# Patient Record
Sex: MALE | Race: WHITE | NOT HISPANIC OR LATINO | Employment: OTHER | RURAL
[De-identification: names, ages, dates, MRNs, and addresses within clinical notes are randomized per-mention and may not be internally consistent; named-entity substitution may affect disease eponyms.]

---

## 2017-06-28 ENCOUNTER — OFFICE VISIT (OUTPATIENT)
Dept: CARDIOLOGY | Facility: CLINIC | Age: 62
End: 2017-06-28

## 2017-06-28 VITALS
HEIGHT: 74 IN | WEIGHT: 293 LBS | BODY MASS INDEX: 37.6 KG/M2 | SYSTOLIC BLOOD PRESSURE: 132 MMHG | HEART RATE: 71 BPM | DIASTOLIC BLOOD PRESSURE: 68 MMHG

## 2017-06-28 DIAGNOSIS — I10 ESSENTIAL HYPERTENSION: ICD-10-CM

## 2017-06-28 DIAGNOSIS — E88.81 METABOLIC SYNDROME: ICD-10-CM

## 2017-06-28 DIAGNOSIS — J44.9 COPD MIXED TYPE (HCC): ICD-10-CM

## 2017-06-28 DIAGNOSIS — I48.0 PAF (PAROXYSMAL ATRIAL FIBRILLATION) (HCC): Primary | ICD-10-CM

## 2017-06-28 DIAGNOSIS — E03.9 ACQUIRED HYPOTHYROIDISM: ICD-10-CM

## 2017-06-28 PROCEDURE — 99213 OFFICE O/P EST LOW 20 MIN: CPT | Performed by: NURSE PRACTITIONER

## 2017-06-28 PROCEDURE — 93000 ELECTROCARDIOGRAM COMPLETE: CPT | Performed by: NURSE PRACTITIONER

## 2017-07-07 RX ORDER — AMLODIPINE BESYLATE 5 MG/1
TABLET ORAL
Qty: 90 TABLET | OUTPATIENT
Start: 2017-07-07

## 2018-01-03 ENCOUNTER — OFFICE VISIT (OUTPATIENT)
Dept: CARDIOLOGY | Facility: CLINIC | Age: 63
End: 2018-01-03

## 2018-01-03 VITALS
SYSTOLIC BLOOD PRESSURE: 130 MMHG | HEIGHT: 74 IN | WEIGHT: 294 LBS | DIASTOLIC BLOOD PRESSURE: 80 MMHG | HEART RATE: 72 BPM | BODY MASS INDEX: 37.73 KG/M2

## 2018-01-03 DIAGNOSIS — I10 ESSENTIAL HYPERTENSION: ICD-10-CM

## 2018-01-03 DIAGNOSIS — E78.00 HYPERCHOLESTEREMIA: ICD-10-CM

## 2018-01-03 DIAGNOSIS — E03.9 ACQUIRED HYPOTHYROIDISM: ICD-10-CM

## 2018-01-03 DIAGNOSIS — E88.81 METABOLIC SYNDROME: ICD-10-CM

## 2018-01-03 DIAGNOSIS — R06.02 SHORTNESS OF BREATH: ICD-10-CM

## 2018-01-03 DIAGNOSIS — I48.0 PAF (PAROXYSMAL ATRIAL FIBRILLATION) (HCC): Primary | ICD-10-CM

## 2018-01-03 PROCEDURE — 99213 OFFICE O/P EST LOW 20 MIN: CPT | Performed by: INTERNAL MEDICINE

## 2018-01-03 PROCEDURE — 93000 ELECTROCARDIOGRAM COMPLETE: CPT | Performed by: INTERNAL MEDICINE

## 2018-01-03 NOTE — PROGRESS NOTES
Chief Complaint   Patient presents with   • Follow-up     For cardiac management. PCP refills medication. Last labs about 3 months ago per PCP. Patient did not bring medication list, patient went over medications verbally. PCP manages Coumadin.   • Shortness of Breath     States about the same, nothing unusual.   • Palpitations     only with over exertion.       CARDIAC COMPLAINTS  dyspnea, lower extremity edema and palpitations        Subjective   Giacomo Gaviria is a 62 y.o. male came in today for his follow up visit.  He has history of chest pain, SOB, PAF, for which he has been managed with Tambocor and Coumadin.  His last cardiac workup done in 2016 showed normal LV function, no ischemia, and his cardiac cath showed small LAD.  He came today with very occasional palpitation and SOB, nothing different than before.  He does have leg edema if he stands for a long time.  He did have lab work done at your office and was told everything was normal.              Cardiac History  Past Surgical History:   Procedure Laterality Date   • CARDIOVASCULAR STRESS TEST  07/06/2006    Stress- (Dr. Vergara) 9 min, 85% THR. BP- 180/110. EF 52%. R/O  Inferior Infarct   • CARDIOVASCULAR STRESS TEST  01/2009    Stress- (Dr. Valadez) 8 min, 85% THR. /90. Negative   • CARDIOVASCULAR STRESS TEST  01/16/2014     L. Myoview- Inferior Infarct. EF 49%   • CARDIOVASCULAR STRESS TEST  04/04/2016    Lexiscan, inferior wall infarct with kina-infarct ischemia   • CATH LAB PROCEDURE  04/19/2016    EF 60%, normal cornaries, small LAD after diagonal   • CONVERTED (HISTORICAL) HOLTER  01/05/2015    Holter- Baseline NSR with mult. runs PAF.   • ECHO - CONVERTED  12/18/2005    Echo- (Dr. Joiner) EF 60%   • ECHO - CONVERTED  06/15/2006    Echo- (Dr. Vergara) EF 65%   • ECHO - CONVERTED  06/11/2008    Echo- (Dr. Vergara) EF 55%   • ECHO - CONVERTED  01/2009    Echo- (Dr. Vergara) EF 60%   • ECHO - CONVERTED  01/16/2014     Echo- EF 55%. Inferior WMA   • ECHO - CONVERTED  04/04/2016    EF 55-60%, mild MR, RVSP 29 mmHg   • KNEE SURGERY         Current Outpatient Prescriptions   Medication Sig Dispense Refill   • amitriptyline (ELAVIL) 10 MG tablet 3 tablets at bedtime     • benazepril (LOTENSIN) 40 MG tablet 1/2 tablet twice daily     • diltiazem (TAZTIA XT) 360 MG 24 hr capsule Take 360 mg by mouth daily.     • doxazosin (CARDURA) 4 MG tablet Take 4 mg by mouth 2 (two) times a day.     • flecainide (TAMBOCOR) 100 MG tablet 1/2 tablet twice daily (Patient taking differently: Take 150 mg by mouth 2 (Two) Times a Day.) 90 tablet 2   • furosemide (LASIX) 80 MG tablet Take 80 mg by mouth daily.     • HYDROcodone-ibuprofen (VICOPROFEN) 7.5-200 MG per tablet Take 1 tablet by mouth 2 (Two) Times a Day.     • ipratropium-albuterol (COMBIVENT RESPIMAT)  MCG/ACT inhaler Inhale 1 puff 4 (four) times a day as needed for wheezing.     • levothyroxine (SYNTHROID) 75 MCG tablet Take 75 mcg by mouth daily.     • nitroglycerin (NITROSTAT) 0.4 MG SL tablet Place 0.4 mg under the tongue every 5 (five) minutes as needed for chest pain. Take no more than 3 doses in 15 minutes.     • potassium chloride (KLOR-CON) 20 MEQ packet Take 20 mEq by mouth 2 (two) times a day.     • tamsulosin (FLOMAX) 0.4 MG capsule 24 hr capsule Take 1 capsule by mouth daily.     • warfarin (COUMADIN) 5 MG tablet Take 5 mg by mouth daily. (PCP monitors tablet)     • zolpidem (AMBIEN) 10 MG tablet Take 10 mg by mouth At Night As Needed for sleep.       No current facility-administered medications for this visit.        Allergies  :  Demerol [meperidine]       Past Medical History:   Diagnosis Date   • Arthritis     Dr. Chavez   • Atrial fibrillation    • Back pain    • H/O knee surgery     bilateral   • Hypercholesteremia    • Hypertension    • Hypothyroidism    • Knee pain    • Sleep apnea        Social History     Social History   • Marital status:      Spouse name:  "N/A   • Number of children: N/A   • Years of education: N/A     Occupational History   • Not on file.     Social History Main Topics   • Smoking status: Former Smoker     Quit date: 1993   • Smokeless tobacco: Current User     Types: Snuff      Comment: Quit in 1993   • Alcohol use No   • Drug use: No   • Sexual activity: Not on file     Other Topics Concern   • Not on file     Social History Narrative       Family History   Problem Relation Age of Onset   • No Known Problems Mother    • Heart attack Father    • Heart attack Brother        Review of Systems   Constitution: Positive for malaise/fatigue. Negative for decreased appetite.   HENT: Negative for congestion and sore throat.    Eyes: Negative for blurred vision.   Cardiovascular: Positive for dyspnea on exertion, leg swelling and palpitations. Negative for chest pain.   Respiratory: Positive for shortness of breath. Negative for snoring.    Endocrine: Negative for cold intolerance and heat intolerance.   Hematologic/Lymphatic: Negative for adenopathy. Does not bruise/bleed easily.   Skin: Negative for itching, nail changes and skin cancer.   Musculoskeletal: Positive for arthritis and myalgias.   Gastrointestinal: Negative for abdominal pain, dysphagia and heartburn.   Genitourinary: Negative for bladder incontinence and frequency.   Neurological: Positive for dizziness. Negative for light-headedness, seizures and vertigo.   Psychiatric/Behavioral: Negative for altered mental status.   Allergic/Immunologic: Negative for environmental allergies and hives.       Diabetes- No  Thyroid- abnormal    Objective     /80  Pulse 72  Ht 188 cm (74.02\")  Wt 133 kg (294 lb)  BMI 37.73 kg/m2    Physical Exam   Constitutional: He is oriented to person, place, and time. He appears well-developed and well-nourished.   HENT:   Head: Normocephalic.   Eyes: EOM are normal. Pupils are equal, round, and reactive to light.   Neck: Normal range of motion. Neck supple. "   Cardiovascular: Normal rate, regular rhythm, S1 normal and S2 normal.    Murmur heard.  Pulmonary/Chest: Effort normal and breath sounds normal.   Abdominal: Soft. Bowel sounds are normal.   Musculoskeletal: Normal range of motion. He exhibits no edema.   Neurological: He is alert and oriented to person, place, and time.   Skin: Skin is warm and dry.   Psychiatric: He has a normal mood and affect.       ECG 12 Lead  Date/Time: 1/3/2018 11:27 AM  Performed by: SYED CORONA  Authorized by: SYED CORONA   Comparison: compared with previous ECG from 6/28/2017  Similar to previous ECG  Rhythm: sinus rhythm  Rate: normal  Conduction: 1st degree and non-specific intraventricular conduction delay  QRS axis: normal  Clinical impression: abnormal ECG                    Assessment/Plan     Giacomo was seen today for follow-up, shortness of breath and palpitations.    Diagnoses and all orders for this visit:    PAF (paroxysmal atrial fibrillation)    Metabolic syndrome    Acquired hypothyroidism    Essential hypertension    Hypercholesteremia    Shortness of breath       His HR and BP is stable.  His BMI is almost 38.  His EKG shows sinus rhythm with 1st degree AV block, almost same as before.  At this time, continue same medications.  I had a long talk with him about diet, exercise, and weight reduction.  Overall cardiac status appears to be stable.  No new investigation was ordered at this time.                  Electronically signed by Syed Corona MD January 3, 2018 11:24 AM

## 2018-08-01 NOTE — PROGRESS NOTES
Chief Complaint   Patient presents with   • Follow-up     For cardiac management. Patient is not on aspirin. Reports that shortness of breath is about the same. Reports that he did have trouble around the first of July with afib, but says it went away.   • Med Refill     PCP does medications.        Cardiac Complaints  dyspnea      Subjective   Giacomo Gaviria is a 63 y.o. male with a history of paroxysmal atrial fibrillation, hypertension, hypothyroidism, and metabolic syndrome. He also has issues with back pain and multiple myalgias for which he has followed with  from . Due to chest pain and shortness of breath a stress test and echocardiogram were ordered in 2016. Possible ischemia was noted. A cardiac catheterization was done and found to have normal coronaries with small LAD past the diagonal. His LV function was normal. He continues to be managed medically.  He returns today for follow up and reports continued shortness of breath unchanged from before.  He does state problems with palpitations and irregular pulse in July but states these problems have resolved.  He continues on coumadin therapy for anticoagulation monitored by PCP.  No refills needed.  He does state INR has been hard to manage as of late as he had biopsy on prostate where they held coumadin and hard to get back into levels since. He does report being diagnosed with prostate CA that is a 6 on the ramirez scale, aggressive observation approach has been implemented by Dr. Armstrong in [place].  He will follow up with serial PSAs.        Cardiac History  Past Surgical History:   Procedure Laterality Date   • CARDIAC CATHETERIZATION  04/19/2016    EF 60%, normal cornaries, small LAD after diagonal   • CARDIOVASCULAR STRESS TEST  07/06/2006    Stress- (Dr. Vergara) 9 min, 85% THR. BP- 180/110. EF 52%. R/O  Inferior Infarct   • CARDIOVASCULAR STRESS TEST  01/2009    Stress- (Dr. Valadez) 8 min, 85% THR. /90. Negative   • CARDIOVASCULAR  STRESS TEST  01/16/2014     L. Myoview- Inferior Infarct. EF 49%   • CARDIOVASCULAR STRESS TEST  04/04/2016    Lexiscan, inferior wall infarct with kina-infarct ischemia   • CONVERTED (HISTORICAL) HOLTER  01/05/2015    Holter- Baseline NSR with mult. runs PAF.   • ECHO - CONVERTED  12/18/2005    Echo- (Dr. Joinre) EF 60%   • ECHO - CONVERTED  06/15/2006    Echo- (Dr. Vergara) EF 65%   • ECHO - CONVERTED  06/11/2008    Echo- (Dr. Vergara) EF 55%   • ECHO - CONVERTED  01/2009    Echo- (Dr. Vergara) EF 60%   • ECHO - CONVERTED  01/16/2014    Echo- EF 55%. Inferior WMA   • ECHO - CONVERTED  04/04/2016    EF 55-60%, mild MR, RVSP 29 mmHg       Current Outpatient Prescriptions   Medication Sig Dispense Refill   • amitriptyline (ELAVIL) 10 MG tablet 3 tablets at bedtime     • amLODIPine (NORVASC) 5 MG tablet Take 5 mg by mouth Daily.     • benazepril (LOTENSIN) 40 MG tablet 1/2 tablet twice daily     • diltiazem (TAZTIA XT) 360 MG 24 hr capsule Take 360 mg by mouth daily.     • doxazosin (CARDURA) 4 MG tablet Take 4 mg by mouth 2 (two) times a day.     • flecainide (TAMBOCOR) 100 MG tablet Take 150 mg by mouth 2 (Two) Times a Day.     • furosemide (LASIX) 80 MG tablet Take 80 mg by mouth daily.     • HYDROcodone-ibuprofen (VICOPROFEN) 7.5-200 MG per tablet Take 1 tablet by mouth 2 (Two) Times a Day.     • levothyroxine (SYNTHROID) 75 MCG tablet Take 75 mcg by mouth daily.     • mometasone-formoterol (DULERA 100) 100-5 MCG/ACT inhaler Inhale 2 puffs 2 (Two) Times a Day.     • montelukast (SINGULAIR) 10 MG tablet Take 10 mg by mouth Every Night.     • nitroglycerin (NITROSTAT) 0.4 MG SL tablet Place 0.4 mg under the tongue every 5 (five) minutes as needed for chest pain. Take no more than 3 doses in 15 minutes.     • potassium chloride (KLOR-CON) 20 MEQ packet Take 20 mEq by mouth 2 (two) times a day.     • tamsulosin (FLOMAX) 0.4 MG capsule 24 hr capsule Take 1 capsule by mouth daily.     • warfarin  (COUMADIN) 5 MG tablet Take 5 mg by mouth daily. (PCP monitors tablet)     • zolpidem (AMBIEN) 10 MG tablet Take 10 mg by mouth At Night As Needed for sleep.       No current facility-administered medications for this visit.        Demerol [meperidine]    Past Medical History:   Diagnosis Date   • Arthritis     Dr. Chavez   • Atrial fibrillation (CMS/HCC)    • Back pain    • H/O knee surgery     bilateral   • Hypercholesteremia    • Hypertension    • Hypothyroidism    • Knee pain    • Sleep apnea        Social History     Social History   • Marital status:      Spouse name: N/A   • Number of children: N/A   • Years of education: N/A     Occupational History   • Not on file.     Social History Main Topics   • Smoking status: Former Smoker     Quit date: 1993   • Smokeless tobacco: Former User     Types: Snuff     Quit date: 5/8/2018      Comment: Quit in 1993   • Alcohol use No   • Drug use: No   • Sexual activity: Not on file     Other Topics Concern   • Not on file     Social History Narrative   • No narrative on file       Family History   Problem Relation Age of Onset   • No Known Problems Mother    • Heart attack Father    • Heart attack Brother        Review of Systems   Constitution: Negative for weakness and malaise/fatigue.   Cardiovascular: Positive for dyspnea on exertion. Negative for chest pain, irregular heartbeat, near-syncope, orthopnea, palpitations and syncope.   Respiratory: Positive for shortness of breath. Negative for cough and wheezing.    Musculoskeletal: Negative for back pain, joint pain and joint swelling.   Gastrointestinal: Negative for anorexia, heartburn, nausea and vomiting.   Genitourinary: Negative for dysuria, hematuria and nocturia.   Neurological: Negative for dizziness, light-headedness and loss of balance.   Psychiatric/Behavioral: Negative for depression and memory loss. The patient is not nervous/anxious.            Objective     /80 (BP Location: Left arm)    "Pulse 67   Ht 188 cm (74.02\")   Wt 132 kg (291 lb)   BMI 37.35 kg/m²     Physical Exam   Constitutional: He is oriented to person, place, and time. He appears well-developed and well-nourished.   HENT:   Head: Normocephalic and atraumatic.   Eyes: Pupils are equal, round, and reactive to light. EOM are normal.   Neck: Normal range of motion. Neck supple.   Cardiovascular: Normal rate and regular rhythm.    Murmur heard.  Pulmonary/Chest: Effort normal and breath sounds normal.   Abdominal: Soft.   Musculoskeletal: Normal range of motion.   Neurological: He is alert and oriented to person, place, and time.   Skin: Skin is warm and dry.   Psychiatric: He has a normal mood and affect. His behavior is normal.         ECG 12 Lead  Date/Time: 8/8/2018 11:31 AM  Performed by: EUGENIO HARRIS  Authorized by: EUGENIO HARRIS   Rhythm: sinus rhythm  BPM: 67  Conduction: non-specific intraventricular conduction delay            Assessment/Plan     HR and BP are stable today. HTN is well managed on current regimen, no adjustment advised.  EKG done today for PAF with coumadin therapy for anticoagulation and tambocor for antiarrythmic therapy shows a NSR with normal QT, similar to prior.  Current advised.  PT/INR he reports with your office.  For hyperlipidemia management, he continues with dietary management.  For COPD, he is following with you and using dulera therapy daily.  He was encouraged to discuss possible pulmonary referral with your office as I feel he would benefit.  BMI elevated at 37.37, good cardiac diet with limited caloric intake, saturated fat, and limited carbs advised as well as activity as tolerated.  6 month follow up scheduled or sooner if needed.        Problems Addressed this Visit        Cardiovascular and Mediastinum    PAF (paroxysmal atrial fibrillation) (CMS/HCC) - Primary    Relevant Medications    amLODIPine (NORVASC) 5 MG tablet    Other Relevant Orders    ECG 12 Lead    Essential " hypertension    Relevant Medications    amLODIPine (NORVASC) 5 MG tablet    Hypercholesteremia       Respiratory    COPD mixed type (CMS/HCC)    Relevant Medications    montelukast (SINGULAIR) 10 MG tablet    mometasone-formoterol (DULERA 100) 100-5 MCG/ACT inhaler    Shortness of breath       Endocrine    Acquired hypothyroidism      Other Visit Diagnoses     Prostate cancer (CMS/[facility])        Obesity (BMI 30-39.9)              Patient's Body mass index is 37.35 kg/m². BMI is above normal parameters. Recommendations include: nutrition counseling.          Electronically signed by HANG Grey August 8, 2018 4:55 PM

## 2018-08-08 ENCOUNTER — OFFICE VISIT (OUTPATIENT)
Dept: CARDIOLOGY | Facility: CLINIC | Age: 63
End: 2018-08-08

## 2018-08-08 VITALS
DIASTOLIC BLOOD PRESSURE: 80 MMHG | HEART RATE: 67 BPM | BODY MASS INDEX: 37.35 KG/M2 | HEIGHT: 74 IN | WEIGHT: 291 LBS | SYSTOLIC BLOOD PRESSURE: 122 MMHG

## 2018-08-08 DIAGNOSIS — J44.9 COPD MIXED TYPE (HCC): ICD-10-CM

## 2018-08-08 DIAGNOSIS — E78.00 HYPERCHOLESTEREMIA: ICD-10-CM

## 2018-08-08 DIAGNOSIS — E66.9 OBESITY (BMI 30-39.9): ICD-10-CM

## 2018-08-08 DIAGNOSIS — C61 PROSTATE CANCER (HCC): ICD-10-CM

## 2018-08-08 DIAGNOSIS — I48.0 PAF (PAROXYSMAL ATRIAL FIBRILLATION) (HCC): Primary | ICD-10-CM

## 2018-08-08 DIAGNOSIS — E03.9 ACQUIRED HYPOTHYROIDISM: ICD-10-CM

## 2018-08-08 DIAGNOSIS — R06.02 SHORTNESS OF BREATH: ICD-10-CM

## 2018-08-08 DIAGNOSIS — I10 ESSENTIAL HYPERTENSION: ICD-10-CM

## 2018-08-08 PROCEDURE — 93000 ELECTROCARDIOGRAM COMPLETE: CPT | Performed by: NURSE PRACTITIONER

## 2018-08-08 PROCEDURE — 99213 OFFICE O/P EST LOW 20 MIN: CPT | Performed by: NURSE PRACTITIONER

## 2018-08-08 RX ORDER — MONTELUKAST SODIUM 10 MG/1
10 TABLET ORAL NIGHTLY
COMMUNITY
End: 2019-07-31 | Stop reason: ALTCHOICE

## 2018-08-08 RX ORDER — FLECAINIDE ACETATE 100 MG/1
150 TABLET ORAL 2 TIMES DAILY
COMMUNITY

## 2018-08-08 RX ORDER — AMLODIPINE BESYLATE 5 MG/1
5 TABLET ORAL DAILY
COMMUNITY

## 2019-01-30 ENCOUNTER — OFFICE VISIT (OUTPATIENT)
Dept: CARDIOLOGY | Facility: CLINIC | Age: 64
End: 2019-01-30

## 2019-01-30 VITALS
BODY MASS INDEX: 38.76 KG/M2 | SYSTOLIC BLOOD PRESSURE: 140 MMHG | WEIGHT: 302 LBS | HEIGHT: 74 IN | DIASTOLIC BLOOD PRESSURE: 80 MMHG | HEART RATE: 72 BPM

## 2019-01-30 DIAGNOSIS — Z79.01 CURRENT USE OF LONG TERM ANTICOAGULATION: ICD-10-CM

## 2019-01-30 DIAGNOSIS — I10 ESSENTIAL HYPERTENSION: ICD-10-CM

## 2019-01-30 DIAGNOSIS — R06.02 SHORTNESS OF BREATH: ICD-10-CM

## 2019-01-30 DIAGNOSIS — E78.00 HYPERCHOLESTEREMIA: ICD-10-CM

## 2019-01-30 DIAGNOSIS — I48.0 PAF (PAROXYSMAL ATRIAL FIBRILLATION) (HCC): Primary | ICD-10-CM

## 2019-01-30 DIAGNOSIS — Z79.899 LONG TERM CURRENT USE OF ANTIARRHYTHMIC DRUG: ICD-10-CM

## 2019-01-30 DIAGNOSIS — E88.81 METABOLIC SYNDROME: ICD-10-CM

## 2019-01-30 PROCEDURE — 93000 ELECTROCARDIOGRAM COMPLETE: CPT | Performed by: NURSE PRACTITIONER

## 2019-01-30 PROCEDURE — 99214 OFFICE O/P EST MOD 30 MIN: CPT | Performed by: NURSE PRACTITIONER

## 2019-01-30 RX ORDER — WARFARIN SODIUM 1 MG/1
TABLET ORAL
COMMUNITY

## 2019-01-30 RX ORDER — FLUTICASONE PROPIONATE 50 MCG
2 SPRAY, SUSPENSION (ML) NASAL DAILY
COMMUNITY

## 2019-01-30 RX ORDER — PAROXETINE 10 MG/1
TABLET, FILM COATED ORAL
COMMUNITY
End: 2019-07-31 | Stop reason: ALTCHOICE

## 2019-01-30 NOTE — PROGRESS NOTES
"Chief Complaint   Patient presents with   • Follow-up     Cardiac management. He states \"couple months ago my heart got out of rhythm, went to ER was giving Lanoxin in ER. Heart went back in rhythm\". He feels was related to anxiety, was started on Paxil. He was dx with prostate cancer 7/2018. PCP writes refills on medication.   • Shortness of Breath     He only notices if in hurry or while eating, he relates to COPD.       Virgil Gaviria is a 63 y.o. male with a history of PAF s/p ablation in the past, hypertension, hypothyroidism, and metabolic syndrome. He also has back pain and multiple myalgias followed at . Due to chest pain and shortness of breath, stress test in 2016 showed possible ischemia. Cath found normal coronaries with small LAD beyond the diagonal. He is anticoagulated with Coumadin monitored by PCP. Flecainide and diltiazem have kept him in sinus. He has been diagnosed with prostate cancer that is a 6 on ramirez scale with aggressive observation approach implemented by Dr. Armstrong in [place].  He will follow up with serial PSAs. He will be seeing another specialist in [place] in March. He came today for follow up. He denies cardiac symptoms, no chest pain, shortness of breath, dizziness or syncope. He had one episode a couple of months ago with palpitations, \"my heart got out of rhythm\".  Treated in the ER with Lanoxin and went back in rhythm. Labs including INR followed with PCP. Stable warfarin dosing.     HPI         Cardiac History:    Past Surgical History:   Procedure Laterality Date   • CARDIAC ABLATION  2008 2004 and 2008 PVA for AF per Dr. Cosmo Hall group   • CARDIAC CATHETERIZATION  04/19/2016    EF 60%, normal cornaries, small LAD after diagonal   • CARDIOVASCULAR STRESS TEST  07/06/2006    Stress- (Dr. Vergara) 9 min, 85% THR. BP- 180/110. EF 52%. R/O  Inferior Infarct   • CARDIOVASCULAR STRESS TEST  01/2009    Stress- (Dr. Valadez) 8 min, 85% THR. BP " 162/90. Negative   • CARDIOVASCULAR STRESS TEST  01/16/2014     L. Myoview- Inferior Infarct. EF 49%   • CARDIOVASCULAR STRESS TEST  04/04/2016    Lexiscan, inferior wall infarct with kina-infarct ischemia   • CONVERTED (HISTORICAL) HOLTER  01/05/2015    Holter- Baseline NSR with mult. runs PAF.   • ECHO - CONVERTED  12/18/2005    Echo- (Dr. Joiner) EF 60%   • ECHO - CONVERTED  06/15/2006    Echo- (Dr. Vergara) EF 65%   • ECHO - CONVERTED  06/11/2008    Echo- (Dr. Vergara) EF 55%   • ECHO - CONVERTED  01/2009    Echo- (Dr. Vergara) EF 60%   • ECHO - CONVERTED  01/16/2014    Echo- EF 55%. Inferior WMA   • ECHO - CONVERTED  04/04/2016    EF 55-60%, mild MR, RVSP 29 mmHg       Current Outpatient Medications   Medication Sig Dispense Refill   • amitriptyline (ELAVIL) 10 MG tablet 3 tablets at bedtime     • amLODIPine (NORVASC) 5 MG tablet Take 5 mg by mouth Daily.     • benazepril (LOTENSIN) 40 MG tablet 1/2 tablet twice daily     • diltiazem (TAZTIA XT) 360 MG 24 hr capsule Take 360 mg by mouth daily.     • doxazosin (CARDURA) 4 MG tablet Take 4 mg by mouth 2 (two) times a day.     • flecainide (TAMBOCOR) 100 MG tablet Take 150 mg by mouth 2 (Two) Times a Day.     • fluticasone (FLONASE) 50 MCG/ACT nasal spray 2 sprays into the nostril(s) as directed by provider Daily.     • furosemide (LASIX) 80 MG tablet Take 80 mg by mouth daily.     • HYDROcodone-ibuprofen (VICOPROFEN) 7.5-200 MG per tablet Take 1 tablet by mouth 2 (Two) Times a Day As Needed.     • levothyroxine (SYNTHROID) 75 MCG tablet Take 75 mcg by mouth daily.     • mometasone-formoterol (DULERA 100) 100-5 MCG/ACT inhaler Inhale 2 puffs 2 (Two) Times a Day.     • montelukast (SINGULAIR) 10 MG tablet Take 10 mg by mouth Every Night.     • nitroglycerin (NITROSTAT) 0.4 MG SL tablet Place 0.4 mg under the tongue every 5 (five) minutes as needed for chest pain. Take no more than 3 doses in 15 minutes.     • PARoxetine (PAXIL) 10 MG tablet 1  tablet in am and 1/2 tablet at night     • potassium chloride (KLOR-CON) 20 MEQ packet Take 20 mEq by mouth 2 (two) times a day.     • tamsulosin (FLOMAX) 0.4 MG capsule 24 hr capsule Take 1 capsule by mouth daily.     • warfarin (COUMADIN) 1 MG tablet 1 tablet on  and .     • warfarin (COUMADIN) 5 MG tablet Take 5 mg by mouth daily. (PCP monitors tablet)     • zolpidem (AMBIEN) 10 MG tablet Take 10 mg by mouth At Night As Needed for sleep.       No current facility-administered medications for this visit.        Tylenol [acetaminophen] and Demerol [meperidine]    Past Medical History:   Diagnosis Date   • Arthritis     Dr. Chavez   • Atrial fibrillation (CMS/HCC)    • Back pain    • Cancer (CMS/HCC) 2018    prostate cancer   • H/O knee surgery     bilateral   • Hypercholesteremia    • Hypertension    • Hypothyroidism    • Knee pain    • Sleep apnea        Social History     Socioeconomic History   • Marital status:      Spouse name: Not on file   • Number of children: Not on file   • Years of education: Not on file   • Highest education level: Not on file   Social Needs   • Financial resource strain: Not on file   • Food insecurity - worry: Not on file   • Food insecurity - inability: Not on file   • Transportation needs - medical: Not on file   • Transportation needs - non-medical: Not on file   Occupational History   • Not on file   Tobacco Use   • Smoking status: Former Smoker     Last attempt to quit:      Years since quittin.0   • Smokeless tobacco: Former User     Types: Snuff     Quit date: 2018   • Tobacco comment: Quit in    Substance and Sexual Activity   • Alcohol use: No   • Drug use: No   • Sexual activity: Not on file   Other Topics Concern   • Not on file   Social History Narrative   • Not on file       Family History   Problem Relation Age of Onset   • No Known Problems Mother    • Heart attack Father    • Heart attack Brother        Review of Systems  "  Constitution: Positive for weight gain (increased by 11 ). Negative for decreased appetite, weakness and malaise/fatigue.   HENT: Negative.    Eyes: Negative.    Cardiovascular: Negative for chest pain, dyspnea on exertion, leg swelling, palpitations and syncope.   Respiratory: Negative for cough.    Endocrine: Negative.    Hematologic/Lymphatic: Negative.    Skin: Negative.    Musculoskeletal: Positive for arthritis, back pain, joint pain, myalgias and stiffness.   Gastrointestinal: Negative for abdominal pain and melena.   Genitourinary: Negative for dysuria and hematuria.   Psychiatric/Behavioral: Negative for altered mental status and depression.   Allergic/Immunologic: Negative.       Diabetes- No  Thyroid-normal    Objective     /80 (BP Location: Left arm)   Pulse 72   Ht 188 cm (74.02\")   Wt (!) 137 kg (302 lb)   BMI 38.76 kg/m²     Physical Exam   Constitutional: He is oriented to person, place, and time. He appears well-developed and well-nourished. No distress.   HENT:   Head: Normocephalic.   Eyes: Pupils are equal, round, and reactive to light.   Neck: Normal range of motion.   Cardiovascular: Normal rate, regular rhythm and intact distal pulses.   Pulmonary/Chest: Effort normal and breath sounds normal. No respiratory distress. He has no wheezes.   Abdominal: Soft. Bowel sounds are normal.   Musculoskeletal: Normal range of motion. He exhibits no edema.   Neurological: He is alert and oriented to person, place, and time.   Skin: Skin is warm and dry. He is not diaphoretic.   Psychiatric: He has a normal mood and affect.   Nursing note and vitals reviewed.       ECG 12 Lead  Date/Time: 1/30/2019 4:58 PM  Performed by: Ondina Parmar APRN  Authorized by: Ondina Parmar APRN   Comparison: compared with previous ECG from 8/8/2018  Similar to previous ECG  Rhythm: sinus rhythm  Rate: normal  BPM: 72  Conduction: 1st degree  Clinical impression: abnormal ECG  Comments:  ms  QTc 428 " "ms                Assessment/Plan    Heart rate and blood pressure are stable. EKG for PAF on flecainide showed sinus rhythm at 72 bpm, first degree AV block and normal QT interval similar to previous EKG. Coumadin for anticoagulation is followed by Eb with stable INR reported. We discussed NOAC and he is going to think about it. He is now off Lipitor secondary to myalgia. He claims the lipids are back up and did not change his symptoms, therefore he is considering restarting. His weight has increased by 11 pounds. We discussed diet in detail. He drinks at least four glasses of 2% mild daily, and often he does this because \"it cools off his insides\". He may need to be treated for acid reflux but he prefers a natural herb? Advised to reduce milk intake and overall calorie intake for weight management. Limit sodium, carbohydrates, and saturated fats. Increase daily activity. He has no chest pain and coronaries were normal in 2016, therefore no testing ordered today. He appears to be stable. We will see him back in six months or sooner if needed.    Giacomo was seen today for follow-up and shortness of breath.    Diagnoses and all orders for this visit:    PAF (paroxysmal atrial fibrillation) (CMS/HCC)    Essential hypertension    Hypercholesteremia    Shortness of breath    Metabolic syndrome    Current use of long term anticoagulation    Long term current use of antiarrhythmic drug    Other orders  -     ECG 12 Lead        Patient's Body mass index is 38.76 kg/m². BMI is above normal parameters. Recommendations include: nutrition counseling.               Electronically signed by HANG Syed,  January 30, 2019 5:05 PM  "

## 2019-07-25 PROCEDURE — 93306 TTE W/DOPPLER COMPLETE: CPT | Performed by: INTERNAL MEDICINE

## 2019-07-26 NOTE — PROGRESS NOTES
Chief Complaint   Patient presents with   • Follow-up     For cardiac management. Patient is not on aspirin. Did have some chest pain, did end up in the ER, was in afib, but was told he had not had a heart attack, was earlier this month. Reports that he could barely walk to the shower without getting out of breath, was put on hydroxyzine for anxiety. Yesterday felt like he has some pressure in his head, states BP was elevated. Last lab work was done at ER, not in chart, did have some done before than with PCP, not in chart. Had an echo at [facility] last week.    • Med Refill     PCP does medication refills. Brought medications with visit.        Cardiac Complaints  dyspnea and palpitations      Subjective   Giacomo Gaviria is a 64 y.o. male with PAF s/p ablation in the past, hypertension, hyperlipidemia, hypothyroidism, and metabolic syndrome.  Patient also has a history of significant myalgias followed by . Cath in 2016  found normal coronaries with small LAD beyond the diagonal. He continues on coumadin therapy for anticoagulation in regards to afib. He has been diagnosed with prostate cancer that is a 6 on ramirez scale with aggressive observation approach implemented by Dr. Armstrong in [place].  At last visit, patient reported a breakthrough episode of afib for which he went to ER and went back to rhythm with digoxin therapy.    He returns today for follow up and reports issues with afib.  He was in the hospital in June and July with breakthrough afib. Patient reported it happened in the evening both times and his heart was beating quickly.  He was given medication while at the hospital and converted.  Patient sometimes will use extra tambocor or digoxin prn when in afib. He was afraid he was having a heart attack but workup was negative. He does admit he has been having shortness of breath with exertion but feels like it is reflux related as he states almost anything he eats comes up in his throat and then he will  notice chest tightness when it happens.  Patient reports problem has been going on for about 3 months and that L.Lysine in the past always worked well to get rid of symptoms but he is doing that now without relief.  He denies following up with Dr. Armstrong for prostate biopsy as he cancelled appointment. Labs he admits remain followed by PCP and were done recently at [facility].  No refills needed.           Cardiac History  Past Surgical History:   Procedure Laterality Date   • CARDIAC ABLATION  2008 2004 and 2008 PVA for AF per Dr. Cosmo Hall group   • CARDIAC CATHETERIZATION  04/19/2016    EF 60%, normal cornaries, small LAD after diagonal   • CARDIOVASCULAR STRESS TEST  07/06/2006    Stress- (Dr. Vergara) 9 min, 85% THR. BP- 180/110. EF 52%. R/O  Inferior Infarct   • CARDIOVASCULAR STRESS TEST  01/2009    Stress- (Dr. Valadez) 8 min, 85% THR. /90. Negative   • CARDIOVASCULAR STRESS TEST  01/16/2014     L. Myoview- Inferior Infarct. EF 49%   • CARDIOVASCULAR STRESS TEST  04/04/2016    Lexiscan, inferior wall infarct with kina-infarct ischemia   • CONVERTED (HISTORICAL) HOLTER  01/05/2015    Holter- Baseline NSR with mult. runs PAF.   • ECHO - CONVERTED  12/18/2005    Echo- (Dr. Joiner) EF 60%   • ECHO - CONVERTED  06/15/2006    Echo- (Dr. Vergara) EF 65%   • ECHO - CONVERTED  06/11/2008    Echo- (Dr. Vergara) EF 55%   • ECHO - CONVERTED  01/2009    Echo- (Dr. Vergara) EF 60%   • ECHO - CONVERTED  01/16/2014    Echo- EF 55%. Inferior WMA   • ECHO - CONVERTED  04/04/2016    EF 55-60%, mild MR, RVSP 29 mmHg   • ECHO - CONVERTED  07/31/2019    @ [facility]. EF 55%. LA- 4.34. AO- 3.9. Mild MR. RVSP- 35 mmHg.       Current Outpatient Medications   Medication Sig Dispense Refill   • amitriptyline (ELAVIL) 10 MG tablet 3 tablets at bedtime     • amLODIPine (NORVASC) 5 MG tablet Take 5 mg by mouth Daily.     • benazepril (LOTENSIN) 40 MG tablet Take 40 mg by mouth Daily.     • digoxin (LANOXIN) 125 MCG  tablet Take 62.5 mcg by mouth Every Other Day As Needed.     • doxazosin (CARDURA) 4 MG tablet Take 4 mg by mouth 2 (two) times a day.     • flecainide (TAMBOCOR) 100 MG tablet Take 150 mg by mouth 2 (Two) Times a Day.     • fluticasone (FLONASE) 50 MCG/ACT nasal spray 2 sprays into the nostril(s) as directed by provider Daily.     • furosemide (LASIX) 80 MG tablet Take 80 mg by mouth daily.     • HYDROcodone-ibuprofen (VICOPROFEN) 7.5-200 MG per tablet Take 1 tablet by mouth 2 (Two) Times a Day As Needed.     • hydrOXYzine (ATARAX) 25 MG tablet Take 25 mg by mouth Every 6 (Six) Hours As Needed for Itching or Anxiety.     • Indacaterol-Glycopyrrolate (UTIBRON NEOHALER) 27.5-15.6 MCG capsule Place  into inhaler and inhale.     • levothyroxine (SYNTHROID) 75 MCG tablet Take 75 mcg by mouth daily.     • loratadine (CLARITIN) 10 MG tablet Take 10 mg by mouth Daily.     • nitroglycerin (NITROSTAT) 0.4 MG SL tablet Place 0.4 mg under the tongue every 5 (five) minutes as needed for chest pain. Take no more than 3 doses in 15 minutes.     • PARoxetine (PAXIL) 20 MG tablet Take 20 mg by mouth Every Morning.     • potassium chloride (KLOR-CON) 20 MEQ packet Take 20 mEq by mouth 2 (two) times a day.     • tamsulosin (FLOMAX) 0.4 MG capsule 24 hr capsule Take 1 capsule by mouth daily.     • warfarin (COUMADIN) 1 MG tablet 1 tablet on Tuesdays and Thursdays.     • warfarin (COUMADIN) 5 MG tablet Take 5 mg by mouth daily. (PCP monitors tablet)     • zolpidem (AMBIEN) 10 MG tablet Take 10 mg by mouth At Night As Needed for sleep.     • diltiaZEM CD (CARDIZEM CD) 240 MG 24 hr capsule One tablet PM 90 capsule 2   • diltiazem XR (DILACOR XR) 120 MG 24 hr capsule Take 1 tablet in AM 90 capsule 3     No current facility-administered medications for this visit.        Tylenol [acetaminophen] and Demerol [meperidine]    Past Medical History:   Diagnosis Date   • Arthritis     Dr. Chavez   • Atrial fibrillation (CMS/HCC)    • Back pain   "  • Cancer (CMS/[facility]) 2018    prostate cancer   • H/O knee surgery     bilateral   • Hypercholesteremia    • Hypertension    • Hypothyroidism    • Knee pain    • Sleep apnea        Social History     Socioeconomic History   • Marital status:      Spouse name: Not on file   • Number of children: Not on file   • Years of education: Not on file   • Highest education level: Not on file   Tobacco Use   • Smoking status: Former Smoker     Last attempt to quit:      Years since quittin.5   • Smokeless tobacco: Former User     Types: Snuff     Quit date: 2018   • Tobacco comment: Quit in    Substance and Sexual Activity   • Alcohol use: No   • Drug use: No       Family History   Problem Relation Age of Onset   • No Known Problems Mother    • Heart attack Father    • Heart attack Brother        Review of Systems   Constitution: Positive for malaise/fatigue. Negative for weakness.   Cardiovascular: Positive for dyspnea on exertion, irregular heartbeat and palpitations. Negative for chest pain, claudication, leg swelling, near-syncope, orthopnea and syncope.   Respiratory: Positive for shortness of breath and wheezing. Negative for cough.    Musculoskeletal: Positive for joint pain and stiffness. Negative for back pain.   Gastrointestinal: Positive for dysphagia and heartburn. Negative for anorexia, nausea and vomiting.   Genitourinary: Negative for dysuria, hematuria, hesitancy and nocturia.   Neurological: Positive for light-headedness. Negative for dizziness, headaches and loss of balance.   Psychiatric/Behavioral: Negative for depression and memory loss. The patient is nervous/anxious.            Objective     /90 (BP Location: Right arm)   Pulse 71   Ht 188 cm (74.02\")   Wt (!) 138 kg (304 lb)   BMI 39.01 kg/m²     Physical Exam   Constitutional: He is oriented to person, place, and time. He appears well-developed and well-nourished.   HENT:   Head: Normocephalic and atraumatic.   Eyes: " EOM are normal. Pupils are equal, round, and reactive to light.   Neck: Normal range of motion. Neck supple.   Cardiovascular: Normal rate and regular rhythm.   Murmur heard.  Pulmonary/Chest: Effort normal. He has wheezes.   Abdominal: Soft.   Musculoskeletal: Normal range of motion.   Neurological: He is alert and oriented to person, place, and time.   Skin: Skin is warm and dry.   Psychiatric: He has a normal mood and affect. His behavior is normal.         ECG 12 Lead  Date/Time: 7/31/2019 11:16 AM  Performed by: Zoey Madison APRN  Authorized by: Zoey Madison APRN   Comparison: compared with previous ECG from 1/30/2019  Similar to previous ECG  Rhythm: sinus rhythm  BPM: 71  Conduction: 1st degree AV block    Clinical impression: abnormal EKG  Comments: Normal QT            Assessment/Plan     HR is stable today with regular rhythm noted.  EKG done today for PAF managed with tambocor and coumadin therapy for anticoagulation shows NSR with first degree block and normal QT.  Current tambocor will be advised with extra prn dosing if needed for breakthrough afib. Patient will be urged to d/c the cardizem 360mg daily and change to 120mg in AM and 240mg PM.  Coumadin continued for anticoagulation as tolerance reported and bleeding and bruising denied. INR followed by your office. Referral to EP discussed for possible study with ablation. HTN not currently well managed but he takes all medication at the same time.  He will be urged to cut norvasc in half and take half in AM and half PM.  Labs he reports with your office including TSH for hypothyroidism management and synthroid dosing.  Could we have for review?  No refills needed as they are followed with your office.  He does report a great deal of issues with reflux and esophageal pain in regards.  He has been on L.lysine for 3months but not helping.  Will you please consider GI referral as he does report cardiac workup negative.  We will try to obtain  records.  BMI noted at 39. Patient counseled on GERD precautions and dietary changes for reflux management, caloric intake, sodium, and fried foods.  6 month follow up recommended or sooner if needed.  He will call with concerns.        Problems Addressed this Visit        Cardiovascular and Mediastinum    PAF (paroxysmal atrial fibrillation) (CMS/[facility]) - Primary    Relevant Medications    digoxin (LANOXIN) 125 MCG tablet    diltiaZEM CD (CARDIZEM CD) 240 MG 24 hr capsule    diltiazem XR (DILACOR XR) 120 MG 24 hr capsule    Other Relevant Orders    ECG 12 Lead    Essential hypertension    Relevant Medications    diltiaZEM CD (CARDIZEM CD) 240 MG 24 hr capsule    diltiazem XR (DILACOR XR) 120 MG 24 hr capsule    Hypercholesteremia       Respiratory    Shortness of breath       Endocrine    Acquired hypothyroidism       Other    Current use of long term anticoagulation    Long term current use of antiarrhythmic drug      Other Visit Diagnoses     Palpitations        Prostate disorder        Chest pain due to GERD        Severe obesity (BMI 35.0-39.9) with comorbidity (CMS/[facility])              Patient's Body mass index is 39.01 kg/m². BMI is above normal parameters. Recommendations include: nutrition counseling.                Electronically signed by HANG Grey July 31, 2019 4:59 PM

## 2019-07-31 ENCOUNTER — OUTSIDE FACILITY SERVICE (OUTPATIENT)
Dept: CARDIOLOGY | Facility: CLINIC | Age: 64
End: 2019-07-31

## 2019-07-31 ENCOUNTER — OFFICE VISIT (OUTPATIENT)
Dept: CARDIOLOGY | Facility: CLINIC | Age: 64
End: 2019-07-31

## 2019-07-31 VITALS
DIASTOLIC BLOOD PRESSURE: 90 MMHG | SYSTOLIC BLOOD PRESSURE: 130 MMHG | BODY MASS INDEX: 39.01 KG/M2 | WEIGHT: 304 LBS | HEIGHT: 74 IN | HEART RATE: 71 BPM

## 2019-07-31 DIAGNOSIS — Z79.01 CURRENT USE OF LONG TERM ANTICOAGULATION: ICD-10-CM

## 2019-07-31 DIAGNOSIS — K21.9 CHEST PAIN DUE TO GERD: ICD-10-CM

## 2019-07-31 DIAGNOSIS — R07.9 CHEST PAIN DUE TO GERD: ICD-10-CM

## 2019-07-31 DIAGNOSIS — Z79.899 LONG TERM CURRENT USE OF ANTIARRHYTHMIC DRUG: ICD-10-CM

## 2019-07-31 DIAGNOSIS — E66.01 SEVERE OBESITY (BMI 35.0-39.9) WITH COMORBIDITY (HCC): ICD-10-CM

## 2019-07-31 DIAGNOSIS — F41.9 ANXIETY: ICD-10-CM

## 2019-07-31 DIAGNOSIS — E03.9 ACQUIRED HYPOTHYROIDISM: ICD-10-CM

## 2019-07-31 DIAGNOSIS — R00.2 PALPITATIONS: ICD-10-CM

## 2019-07-31 DIAGNOSIS — E78.00 HYPERCHOLESTEREMIA: ICD-10-CM

## 2019-07-31 DIAGNOSIS — R06.02 SHORTNESS OF BREATH: ICD-10-CM

## 2019-07-31 DIAGNOSIS — I48.0 PAF (PAROXYSMAL ATRIAL FIBRILLATION) (HCC): Primary | ICD-10-CM

## 2019-07-31 DIAGNOSIS — N42.9 PROSTATE DISORDER: ICD-10-CM

## 2019-07-31 DIAGNOSIS — I10 ESSENTIAL HYPERTENSION: ICD-10-CM

## 2019-07-31 PROCEDURE — 93000 ELECTROCARDIOGRAM COMPLETE: CPT | Performed by: NURSE PRACTITIONER

## 2019-07-31 PROCEDURE — 99214 OFFICE O/P EST MOD 30 MIN: CPT | Performed by: NURSE PRACTITIONER

## 2019-07-31 RX ORDER — HYDROXYZINE HYDROCHLORIDE 25 MG/1
25 TABLET, FILM COATED ORAL EVERY 6 HOURS PRN
COMMUNITY

## 2019-07-31 RX ORDER — LORATADINE 10 MG/1
10 TABLET ORAL DAILY
COMMUNITY

## 2019-07-31 RX ORDER — PAROXETINE HYDROCHLORIDE 20 MG/1
20 TABLET, FILM COATED ORAL EVERY MORNING
COMMUNITY
End: 2020-01-30 | Stop reason: ALTCHOICE

## 2019-07-31 RX ORDER — DIGOXIN 125 MCG
62.5 TABLET ORAL
COMMUNITY

## 2019-07-31 RX ORDER — DILTIAZEM HYDROCHLORIDE 120 MG/1
CAPSULE, EXTENDED RELEASE ORAL
Qty: 90 CAPSULE | Refills: 3 | Status: SHIPPED | OUTPATIENT
Start: 2019-07-31 | End: 2020-01-29 | Stop reason: ALTCHOICE

## 2019-07-31 RX ORDER — DILTIAZEM HYDROCHLORIDE 240 MG/1
CAPSULE, COATED, EXTENDED RELEASE ORAL
Qty: 90 CAPSULE | Refills: 2 | Status: SHIPPED | OUTPATIENT
Start: 2019-07-31 | End: 2020-01-29 | Stop reason: ALTCHOICE

## 2020-01-29 ENCOUNTER — OFFICE VISIT (OUTPATIENT)
Dept: CARDIOLOGY | Facility: CLINIC | Age: 65
End: 2020-01-29

## 2020-01-29 VITALS
SYSTOLIC BLOOD PRESSURE: 138 MMHG | WEIGHT: 315 LBS | HEIGHT: 74 IN | BODY MASS INDEX: 40.43 KG/M2 | DIASTOLIC BLOOD PRESSURE: 82 MMHG | HEART RATE: 83 BPM

## 2020-01-29 DIAGNOSIS — I48.0 PAF (PAROXYSMAL ATRIAL FIBRILLATION) (HCC): ICD-10-CM

## 2020-01-29 DIAGNOSIS — E88.81 METABOLIC SYNDROME: ICD-10-CM

## 2020-01-29 DIAGNOSIS — E78.00 HYPERCHOLESTEREMIA: ICD-10-CM

## 2020-01-29 DIAGNOSIS — Z79.899 LONG TERM CURRENT USE OF ANTIARRHYTHMIC DRUG: ICD-10-CM

## 2020-01-29 DIAGNOSIS — J44.9 COPD MIXED TYPE (HCC): ICD-10-CM

## 2020-01-29 DIAGNOSIS — I10 ESSENTIAL HYPERTENSION: Primary | ICD-10-CM

## 2020-01-29 DIAGNOSIS — Z79.01 CURRENT USE OF LONG TERM ANTICOAGULATION: ICD-10-CM

## 2020-01-29 DIAGNOSIS — R06.02 SHORTNESS OF BREATH: ICD-10-CM

## 2020-01-29 PROCEDURE — 99214 OFFICE O/P EST MOD 30 MIN: CPT | Performed by: NURSE PRACTITIONER

## 2020-01-29 PROCEDURE — 93000 ELECTROCARDIOGRAM COMPLETE: CPT | Performed by: NURSE PRACTITIONER

## 2020-01-29 RX ORDER — MONTELUKAST SODIUM 10 MG/1
10 TABLET ORAL NIGHTLY
COMMUNITY

## 2020-01-29 RX ORDER — ATORVASTATIN CALCIUM 20 MG/1
20 TABLET, FILM COATED ORAL DAILY
COMMUNITY

## 2020-01-29 RX ORDER — CYCLOBENZAPRINE HCL 10 MG
10 TABLET ORAL EVERY 8 HOURS PRN
COMMUNITY

## 2020-01-29 RX ORDER — DILTIAZEM HYDROCHLORIDE 360 MG/1
360 CAPSULE, EXTENDED RELEASE ORAL DAILY
COMMUNITY

## 2020-01-29 RX ORDER — PAROXETINE 10 MG/1
TABLET, FILM COATED ORAL
COMMUNITY
End: 2020-07-29 | Stop reason: ALTCHOICE

## 2020-01-29 NOTE — PROGRESS NOTES
Chief Complaint   Patient presents with   • Follow-up     Cardiac management. He reports went back to Diltiazem  mg daily due to elevated B/P.   • Lab     Last labs 2 weeks ago per PCP. PCP writes refills on medication. PCP started Lipitor.   • Shortness of Breath     Relates to weight, and back problems for the last couple months.       Virgil Gaviria is a 64 y.o. male with PAF s/p ablation in the past, hypertension, hyperkalemia, hypothyroidism, and metabolic syndrome. Patient also has a history of significant myalgias followed by . Cath in 2016  found normal coronaries with small LAD beyond the diagonal. He is anticoagulated with warfarin for PAF. He has been diagnosed with prostate cancer that is a 6 on ramirez scale with aggressive observation approach implemented by Dr. Armstrong in [place].  At last visit, patient reported a breakthrough episode of afib for which he went to ER and went back to rhythm with digoxin therapy. He was advised to take extra dose of flecainide as needed, divide Cardizem dose from 360 mg QD to 120 mg in am 240 mg in pm. Norvasc also divided for better HTN coverage. Echo repeated 7/2019 showed stable EF at 55%, LA 4.34, mild MR. He came today for follow up. He went back to diltiazem 360 mg which has worked better for him. He denies any recurrent AF. Has not needed PRN dose of flecainide. He denies chest pain but admits to shortness of breath he attributes to weight gain.      HPI         Cardiac History:    Past Surgical History:   Procedure Laterality Date   • CARDIAC ABLATION  2008 2004 and 2008 PVA for AF per Dr. Cosmo Hall group   • CARDIAC CATHETERIZATION  04/19/2016    EF 60%, normal cornaries, small LAD after diagonal   • CARDIOVASCULAR STRESS TEST  07/06/2006    Stress- (Dr. Vergara) 9 min, 85% THR. BP- 180/110. EF 52%. R/O  Inferior Infarct   • CARDIOVASCULAR STRESS TEST  01/2009    Stress- (Dr. Valadez) 8 min, 85% THR. /90. Negative   •  CARDIOVASCULAR STRESS TEST  01/16/2014     L. Myoview- Inferior Infarct. EF 49%   • CARDIOVASCULAR STRESS TEST  04/04/2016    Lexiscan, inferior wall infarct with kina-infarct ischemia   • CONVERTED (HISTORICAL) HOLTER  01/05/2015    Holter- Baseline NSR with mult. runs PAF.   • ECHO - CONVERTED  12/18/2005    Echo- (Dr. Joiner) EF 60%   • ECHO - CONVERTED  06/15/2006    Echo- (Dr. Vergara) EF 65%   • ECHO - CONVERTED  06/11/2008    Echo- (Dr. Vergara) EF 55%   • ECHO - CONVERTED  01/2009    Echo- (Dr. Vergara) EF 60%   • ECHO - CONVERTED  01/16/2014    Echo- EF 55%. Inferior WMA   • ECHO - CONVERTED  04/04/2016    EF 55-60%, mild MR, RVSP 29 mmHg   • ECHO - CONVERTED  07/31/2019    @ [facility]. EF 55%. LA- 4.34. AO- 3.9. Mild MR. RVSP- 35 mmHg.       Current Outpatient Medications   Medication Sig Dispense Refill   • amitriptyline (ELAVIL) 10 MG tablet 3 tablets at bedtime     • amLODIPine (NORVASC) 5 MG tablet Take 5 mg by mouth Daily.     • atorvastatin (LIPITOR) 20 MG tablet Take 20 mg by mouth Daily.     • benazepril (LOTENSIN) 40 MG tablet Take 40 mg by mouth Daily.     • cyclobenzaprine (FLEXERIL) 10 MG tablet Take 10 mg by mouth Every 8 (Eight) Hours As Needed for Muscle Spasms.     • digoxin (LANOXIN) 125 MCG tablet Take 62.5 mcg by mouth Every Other Day As Needed.     • dilTIAZem CD (CARDIZEM CD) 360 MG 24 hr capsule Take 360 mg by mouth Daily.     • doxazosin (CARDURA) 4 MG tablet Take 4 mg by mouth 2 (two) times a day.     • flecainide (TAMBOCOR) 100 MG tablet Take 150 mg by mouth 2 (Two) Times a Day.     • fluticasone (FLONASE) 50 MCG/ACT nasal spray 2 sprays into the nostril(s) as directed by provider Daily.     • furosemide (LASIX) 80 MG tablet Take 80 mg by mouth daily.     • HYDROcodone-ibuprofen (VICOPROFEN) 7.5-200 MG per tablet Take 1 tablet by mouth 2 (Two) Times a Day As Needed.     • hydrOXYzine (ATARAX) 25 MG tablet Take 25 mg by mouth Every 6 (Six) Hours As Needed for Itching or  Anxiety.     • levothyroxine (SYNTHROID) 75 MCG tablet Take 75 mcg by mouth daily.     • loratadine (CLARITIN) 10 MG tablet Take 10 mg by mouth Daily.     • montelukast (SINGULAIR) 10 MG tablet Take 10 mg by mouth Every Night.     • nitroglycerin (NITROSTAT) 0.4 MG SL tablet Place 0.4 mg under the tongue every 5 (five) minutes as needed for chest pain. Take no more than 3 doses in 15 minutes.     • PARoxetine (PAXIL) 10 MG tablet Take 1 1/2 tablets once daily.     • potassium chloride (KLOR-CON) 20 MEQ packet Take 20 mEq by mouth 2 (two) times a day.     • tamsulosin (FLOMAX) 0.4 MG capsule 24 hr capsule Take 1 capsule by mouth daily.     • warfarin (COUMADIN) 1 MG tablet 1 tablet on  and .     • warfarin (COUMADIN) 5 MG tablet Take 5 mg by mouth daily. (PCP monitors tablet)     • zolpidem (AMBIEN) 10 MG tablet Take 10 mg by mouth Every Night.     • Indacaterol-Glycopyrrolate (UTIBRON NEOHALER) 27.5-15.6 MCG capsule Place  into inhaler and inhale.       No current facility-administered medications for this visit.      Tylenol [acetaminophen] and Demerol [meperidine]    Past Medical History:   Diagnosis Date   • Arthritis     Dr. Chavez   • Atrial fibrillation (CMS/HCC)    • Back pain    • Cancer (CMS/HCC) 2018    prostate cancer   • H/O knee surgery     bilateral   • Hypercholesteremia    • Hypertension    • Hypothyroidism    • Knee pain    • Sleep apnea      Social History     Socioeconomic History   • Marital status:      Spouse name: Not on file   • Number of children: Not on file   • Years of education: Not on file   • Highest education level: Not on file   Tobacco Use   • Smoking status: Former Smoker     Last attempt to quit:      Years since quittin.0   • Smokeless tobacco: Former User     Types: Snuff     Quit date: 2018   • Tobacco comment: Quit in    Substance and Sexual Activity   • Alcohol use: No   • Drug use: No     Family History   Problem Relation Age of  "Onset   • No Known Problems Mother    • Heart attack Father    • Heart attack Brother      Review of Systems   Constitution: Positive for weight gain (increased 15 lb). Negative for decreased appetite and malaise/fatigue.   HENT: Negative.    Eyes: Negative.    Cardiovascular: Negative for chest pain, dyspnea on exertion, leg swelling, palpitations and syncope.   Respiratory: Negative for cough and shortness of breath.    Endocrine: Negative.    Hematologic/Lymphatic: Negative.    Skin: Negative.    Musculoskeletal: Negative for falls and myalgias.   Gastrointestinal: Negative for abdominal pain and melena.   Genitourinary: Negative for dysuria and hematuria.   Neurological: Negative for dizziness.   Psychiatric/Behavioral: Negative for altered mental status and depression.   Allergic/Immunologic: Negative.       Diabetes- No  Thyroid-normal    Objective     /82 (BP Location: Left arm)   Pulse 83   Ht 188 cm (74.02\")   Wt (!) 145 kg (319 lb)   BMI 40.94 kg/m²     Physical Exam   Constitutional: He is oriented to person, place, and time. He appears well-developed and well-nourished. No distress.   HENT:   Head: Normocephalic.   Eyes: Pupils are equal, round, and reactive to light.   Neck: Normal range of motion.   Cardiovascular: Normal rate, regular rhythm and intact distal pulses.   Murmur heard.  Pulmonary/Chest: Effort normal and breath sounds normal. No respiratory distress. He has no wheezes.   Abdominal: Soft. Bowel sounds are normal.   Musculoskeletal: Normal range of motion. He exhibits no edema.   Neurological: He is alert and oriented to person, place, and time.   Skin: Skin is warm and dry. He is not diaphoretic.   Psychiatric: He has a normal mood and affect.   Nursing note and vitals reviewed.       ECG 12 Lead  Date/Time: 1/30/2020 5:07 PM  Performed by: Ondina Parmar APRN  Authorized by: Ondina Parmar APRN   Comparison: compared with previous ECG from 7/29/2019  Similar to previous " ECG  Rhythm: sinus rhythm  Rate: normal  BPM: 83  Conduction: 1st degree AV block    Clinical impression: non-specific ECG  Comments:  ms  QTc 420 ms                 Assessment/Plan      Giacomo was seen today for follow-up, lab and shortness of breath.    Diagnoses and all orders for this visit:    Essential hypertension    Hypercholesteremia    PAF (paroxysmal atrial fibrillation) (CMS/HCC)  -     ECG 12 Lead    COPD mixed type (CMS/HCC)    Shortness of breath    Current use of long term anticoagulation    Long term current use of antiarrhythmic drug  -     ECG 12 Lead    Metabolic syndrome    Heart rate stable. Blood pressure improved but remains upper limit of normal. EKG for PAF managed with flecainide, digoxin, and Cardizem showed he remains in sinus rhythm, first degree AVB with MT interval 234 ms. QT normal. Continue the same antiarrhythmic. PT/INR followed by PCP.  His weight is up 15 lbs. We had a long discussion about diet and carbohydrate intake. Limit sodium to 1500 mg daily. Recent echo reviewed with him showing stable EF and valvular structures. LA and aortic root mildly dilated. PA pressure mildly elevated. Continue antihypertensives. No changes made today. Lipids managed with atorvastatin. Lipids followed by PCP. Could we get next copy? He is going to try to lose down to 300 by next visit. He appears stable. Follow up in six months or sooner if needed.     Patient's Body mass index is 40.94 kg/m². BMI is above normal parameters. Recommendations include: nutrition counseling.               Electronically signed by HANG Syed,  January 30, 2020 5:23 PM

## 2020-07-29 ENCOUNTER — OFFICE VISIT (OUTPATIENT)
Dept: CARDIOLOGY | Facility: CLINIC | Age: 65
End: 2020-07-29

## 2020-07-29 VITALS
DIASTOLIC BLOOD PRESSURE: 80 MMHG | TEMPERATURE: 97.9 F | HEIGHT: 74 IN | HEART RATE: 68 BPM | WEIGHT: 315 LBS | BODY MASS INDEX: 40.43 KG/M2 | SYSTOLIC BLOOD PRESSURE: 130 MMHG

## 2020-07-29 DIAGNOSIS — E66.01 CLASS 3 SEVERE OBESITY DUE TO EXCESS CALORIES WITH SERIOUS COMORBIDITY AND BODY MASS INDEX (BMI) OF 40.0 TO 44.9 IN ADULT (HCC): ICD-10-CM

## 2020-07-29 DIAGNOSIS — Z79.899 LONG TERM CURRENT USE OF ANTIARRHYTHMIC DRUG: ICD-10-CM

## 2020-07-29 DIAGNOSIS — G47.39 OTHER SLEEP APNEA: ICD-10-CM

## 2020-07-29 DIAGNOSIS — Z79.01 CURRENT USE OF LONG TERM ANTICOAGULATION: ICD-10-CM

## 2020-07-29 DIAGNOSIS — I48.0 PAF (PAROXYSMAL ATRIAL FIBRILLATION) (HCC): Primary | ICD-10-CM

## 2020-07-29 DIAGNOSIS — C61 PROSTATE CANCER (HCC): ICD-10-CM

## 2020-07-29 DIAGNOSIS — I10 ESSENTIAL HYPERTENSION: ICD-10-CM

## 2020-07-29 DIAGNOSIS — E78.00 HYPERCHOLESTEREMIA: ICD-10-CM

## 2020-07-29 DIAGNOSIS — R00.2 PALPITATIONS: ICD-10-CM

## 2020-07-29 PROCEDURE — 93000 ELECTROCARDIOGRAM COMPLETE: CPT | Performed by: NURSE PRACTITIONER

## 2020-07-29 PROCEDURE — 99214 OFFICE O/P EST MOD 30 MIN: CPT | Performed by: NURSE PRACTITIONER

## 2020-07-29 RX ORDER — PAROXETINE HYDROCHLORIDE 20 MG/1
20 TABLET, FILM COATED ORAL EVERY MORNING
COMMUNITY

## 2020-07-29 RX ORDER — ASCORBIC ACID 500 MG
500 TABLET ORAL DAILY
COMMUNITY

## 2020-07-29 NOTE — PROGRESS NOTES
Chief Complaint   Patient presents with   • Follow-up     For cardiac management. Patient is not on aspirin. PCP manages warfarin. Last lab work was done a couple of months ago per PCP, not in chart. Did have a new sleep study done not long ago, is getting new machine. States that he has a some chest pain, did forget medication one day and had chest pain. States that shortness of breath has been worse this summer, states that he does have O2 of a day. States that he has had some palpitations, states that has had one day where they were rough.    • Med Refill     PCP does medication refills. Brought medications with visit.        Cardiac Complaints  palpitations      Subjective   Giacomo Gaviria is a 65 y.o. male with PAF s/p ablation in the past, hypertension, sleep apnea, hyperkalemia, hypothyroidism, and metabolic syndrome. Patient also has a history of significant myalgias followed by . Cath in 2016  found normal coronaries with small LAD beyond the diagonal. He is anticoagulated with warfarin for PAF. He has been diagnosed with prostate cancer that is a 6 on ramirez scale with aggressive observation approach implemented by Dr. Armstrong in [place].  At last visit, patient reported a breakthrough episode of afib for which he went to ER and went back to rhythm with digoxin therapy. He was advised to take extra dose of flecainide as needed, divide Cardizem dose from 360 mg QD to 120 mg in am 240 mg in pm. Norvasc also divided for better HTN coverage. Echo repeated 7/2019 showed stable EF at 55%, LA 4.34, mild MR.    Patient returns today for follow up and denies any new concerns. He does admit to one episode of chest pain but reports this was related to forgetting his medication one day. SOA persists and he states with humidity this is worse. He states that he recently had a repeat sleep study that was positive and he is to be receiving a new CPAP mask in regards. Palpitations had been bad several weeks ago, but he  has not had any issues since. Labs remain followed by PCP and were last done a few weeks ago, no current available. No refills needed.           Cardiac History  Past Surgical History:   Procedure Laterality Date   • CARDIAC ABLATION  2008 2004 and 2008 PVA for AF per Dr. Cosmo Hall group   • CARDIAC CATHETERIZATION  04/19/2016    EF 60%, normal cornaries, small LAD after diagonal   • CARDIOVASCULAR STRESS TEST  07/06/2006    Stress- (Dr. Vergara) 9 min, 85% THR. BP- 180/110. EF 52%. R/O  Inferior Infarct   • CARDIOVASCULAR STRESS TEST  01/2009    Stress- (Dr. Valadez) 8 min, 85% THR. /90. Negative   • CARDIOVASCULAR STRESS TEST  01/16/2014     L. Myoview- Inferior Infarct. EF 49%   • CARDIOVASCULAR STRESS TEST  04/04/2016    Lexiscan, inferior wall infarct with kina-infarct ischemia   • CONVERTED (HISTORICAL) HOLTER  01/05/2015    Holter- Baseline NSR with mult. runs PAF.   • ECHO - CONVERTED  12/18/2005    Echo- (Dr. Joiner) EF 60%   • ECHO - CONVERTED  06/15/2006    Echo- (Dr. Vergara) EF 65%   • ECHO - CONVERTED  06/11/2008    Echo- (Dr. Vergara) EF 55%   • ECHO - CONVERTED  01/2009    Echo- (Dr. Vergara) EF 60%   • ECHO - CONVERTED  01/16/2014    Echo- EF 55%. Inferior WMA   • ECHO - CONVERTED  04/04/2016    EF 55-60%, mild MR, RVSP 29 mmHg   • ECHO - CONVERTED  07/31/2019    @ [facility]. EF 55%. LA- 4.34. AO- 3.9. Mild MR. RVSP- 35 mmHg.       Current Outpatient Medications   Medication Sig Dispense Refill   • amitriptyline (ELAVIL) 10 MG tablet 3 tablets at bedtime     • amLODIPine (NORVASC) 5 MG tablet Take 5 mg by mouth Daily.     • atorvastatin (LIPITOR) 20 MG tablet Take 20 mg by mouth Daily.     • benazepril (LOTENSIN) 40 MG tablet Take 20 mg by mouth 2 (two) times a day.     • cyclobenzaprine (FLEXERIL) 10 MG tablet Take 10 mg by mouth Every 8 (Eight) Hours As Needed for Muscle Spasms.     • digoxin (LANOXIN) 125 MCG tablet Take 62.5 mcg by mouth Every Other Day As Needed.     •  dilTIAZem CD (CARDIZEM CD) 360 MG 24 hr capsule Take 360 mg by mouth Daily.     • doxazosin (CARDURA) 4 MG tablet Take 4 mg by mouth 2 (two) times a day.     • flecainide (TAMBOCOR) 100 MG tablet Take 150 mg by mouth 2 (Two) Times a Day.     • fluticasone (FLONASE) 50 MCG/ACT nasal spray 2 sprays into the nostril(s) as directed by provider Daily.     • Fluticasone-Umeclidin-Vilant (TRELEGY ELLIPTA IN) Inhale 1 puff Daily.     • furosemide (LASIX) 80 MG tablet Take 80 mg by mouth daily.     • HYDROcodone-ibuprofen (VICOPROFEN) 7.5-200 MG per tablet Take 1 tablet by mouth 2 (Two) Times a Day As Needed.     • hydrOXYzine (ATARAX) 25 MG tablet Take 25 mg by mouth Every 6 (Six) Hours As Needed for Itching or Anxiety.     • levothyroxine (SYNTHROID) 75 MCG tablet Take 75 mcg by mouth daily.     • loratadine (CLARITIN) 10 MG tablet Take 10 mg by mouth Daily.     • montelukast (SINGULAIR) 10 MG tablet Take 10 mg by mouth Every Night.     • nitroglycerin (NITROSTAT) 0.4 MG SL tablet Place 0.4 mg under the tongue every 5 (five) minutes as needed for chest pain. Take no more than 3 doses in 15 minutes.     • PARoxetine (PAXIL) 20 MG tablet Take 20 mg by mouth Every Morning.     • potassium chloride (KLOR-CON) 20 MEQ packet Take 20 mEq by mouth 2 (two) times a day.     • tamsulosin (FLOMAX) 0.4 MG capsule 24 hr capsule Take 1 capsule by mouth daily.     • vitamin C (ASCORBIC ACID) 500 MG tablet Take 500 mg by mouth Daily.     • warfarin (COUMADIN) 1 MG tablet 1 tablet on Tuesdays and Thursdays.     • warfarin (COUMADIN) 5 MG tablet Take 5 mg by mouth daily. (PCP monitors tablet)     • zolpidem (AMBIEN) 10 MG tablet Take 10 mg by mouth Every Night.       No current facility-administered medications for this visit.        Tylenol [acetaminophen] and Demerol [meperidine]    Past Medical History:   Diagnosis Date   • Arthritis     Dr. Chavez   • Atrial fibrillation (CMS/HCC)    • Back pain    • Cancer (CMS/HCC) 07/2018    prostate  "cancer   • H/O knee surgery     bilateral   • Hypercholesteremia    • Hypertension    • Hypothyroidism    • Knee pain    • Sleep apnea        Social History     Socioeconomic History   • Marital status:      Spouse name: Not on file   • Number of children: Not on file   • Years of education: Not on file   • Highest education level: Not on file   Tobacco Use   • Smoking status: Former Smoker     Last attempt to quit:      Years since quittin.5   • Smokeless tobacco: Former User     Types: Snuff     Quit date: 2018   • Tobacco comment: Quit in    Substance and Sexual Activity   • Alcohol use: No   • Drug use: No       Family History   Problem Relation Age of Onset   • No Known Problems Mother    • Heart attack Father    • Heart attack Brother        Review of Systems   Constitution: Negative for malaise/fatigue and night sweats.   Cardiovascular: Positive for palpitations. Negative for chest pain, claudication, dyspnea on exertion, irregular heartbeat, leg swelling, near-syncope and syncope.   Respiratory: Positive for shortness of breath. Negative for cough and wheezing.    Musculoskeletal: Positive for joint pain and stiffness. Negative for back pain.   Gastrointestinal: Negative for anorexia, heartburn, melena, nausea and vomiting.   Genitourinary: Negative for dysuria, hematuria, hesitancy and nocturia.   Neurological: Negative for dizziness, light-headedness and loss of balance.   Psychiatric/Behavioral: Negative for depression and memory loss. The patient is not nervous/anxious.            Objective     /80 (BP Location: Right arm)   Pulse 68   Temp 97.9 °F (36.6 °C)   Ht 188 cm (74.02\")   Wt (!) 144 kg (318 lb)   BMI 40.81 kg/m²     Physical Exam   Constitutional: He is oriented to person, place, and time. He appears well-developed and well-nourished.   HENT:   Head: Normocephalic and atraumatic.   Eyes: Pupils are equal, round, and reactive to light. EOM are normal.   Neck: " Normal range of motion. Neck supple.   Cardiovascular: Normal rate and regular rhythm.   Murmur heard.  Pulmonary/Chest: Effort normal and breath sounds normal.   Abdominal: Soft.   Musculoskeletal: Normal range of motion.   Neurological: He is alert and oriented to person, place, and time.   Skin: Skin is warm and dry.   Psychiatric: He has a normal mood and affect. His behavior is normal.         ECG 12 Lead  Date/Time: 7/29/2020 3:06 PM  Performed by: Zoey Madison APRN  Authorized by: Zoey Madison APRN   Comparison: compared with previous ECG from 1/30/2020  Similar to previous ECG  Rhythm: sinus rhythm  BPM: 68  Conduction: 1st degree AV block    Clinical impression: abnormal EKG  Comments: Normal QT            Assessment/Plan     PAF s/p ablation:  Remains on cardizem and tambocor therapy. EKG done today in regards shows NSR with normal QT. Digoxin continued at current. He is anticoagulated with coumadin, followed by your office. Current continued.    HTN:  Blood pressure stable. No changes to current norvasc, lotensin, or cardizem therapy advised. Limited sodium intake encouraged.    Cardiac status:  Stable, no new concerns are voiced. NO repeat workup recommended. If symptoms should develop, patient aware to call.     Hyperlipidemia;  On statin therapy with lipitor. No side effects noted. Labs including FLP followed by your office. Can we have for review?     Sleep apnea:  Receiving a new CPAP machine soon. Patient reports recent sleep study that was positive for desaturation.    BMI noted at 40.81, patient urged on good cardiac diet with limited carbs, calories, and activity as tolerated advised.    Refills per request.    6 month follow up recommended or sooner if needed.         Problems Addressed this Visit        Cardiovascular and Mediastinum    PAF (paroxysmal atrial fibrillation) (CMS/[facility]) - Primary    Relevant Orders    ECG 12 Lead    Essential hypertension    Hypercholesteremia        Digestive    Severe obesity (BMI 35.0-39.9) with comorbidity (CMS/HCC)       Genitourinary    Prostate cancer (CMS/HCC)       Other    Current use of long term anticoagulation    Long term current use of antiarrhythmic drug      Other Visit Diagnoses     Palpitations        Other sleep apnea              Patient's Body mass index is 40.81 kg/m². BMI is above normal parameters. Recommendations include: nutrition counseling.                Electronically signed by HANG Grey July 30, 2020 14:06

## 2020-07-30 PROBLEM — C61 PROSTATE CANCER (HCC): Status: ACTIVE | Noted: 2020-07-30

## 2020-07-30 PROBLEM — E66.01 SEVERE OBESITY (BMI 35.0-39.9) WITH COMORBIDITY (HCC): Status: ACTIVE | Noted: 2020-07-30

## 2020-09-16 ENCOUNTER — TELEPHONE (OUTPATIENT)
Dept: CARDIOLOGY | Facility: CLINIC | Age: 65
End: 2020-09-16

## 2020-09-16 NOTE — TELEPHONE ENCOUNTER
Patient is taking flecainide 150 mg BID. He was made aware to increase digoxin 250 mcg. Patient verbalized understanding.

## 2020-09-16 NOTE — TELEPHONE ENCOUNTER
Patient states that yesterday he felt like he was in afib and went to PCP office. PCP office did an EKG and he was in afib. He states that PCP had him go to [facility] this morning and get an EKG and labs drawn. I will call and get. Patient states that he took whole tablet of digoxin 125 mcg around noon and he is still feeling like he is in afib.     Patient is asking what medication he could take extra of to help with afib?    I will attempt to call [facility] and get EKG and labs.

## 2020-09-16 NOTE — TELEPHONE ENCOUNTER
Is he on 100mg of tambocor or 150mg BID? If 100, increase to 150mg BID. If he is already on 150mg increase the digoxin to 250mcg.